# Patient Record
Sex: MALE | Race: WHITE | HISPANIC OR LATINO | Employment: FULL TIME | URBAN - METROPOLITAN AREA
[De-identification: names, ages, dates, MRNs, and addresses within clinical notes are randomized per-mention and may not be internally consistent; named-entity substitution may affect disease eponyms.]

---

## 2022-05-06 ENCOUNTER — HOSPITAL ENCOUNTER (EMERGENCY)
Facility: OTHER | Age: 22
Discharge: HOME | End: 2022-05-06
Attending: EMERGENCY MEDICINE
Payer: COMMERCIAL

## 2022-05-06 VITALS
DIASTOLIC BLOOD PRESSURE: 64 MMHG | TEMPERATURE: 98 F | HEART RATE: 63 BPM | RESPIRATION RATE: 17 BRPM | SYSTOLIC BLOOD PRESSURE: 131 MMHG
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
## 2022-05-06 DIAGNOSIS — R10.9 RIGHT FLANK PAIN: ICD-10-CM

## 2022-05-06 DIAGNOSIS — S20.20XA: ICD-10-CM

## 2022-05-06 LAB
BILIRUB UR QL STRIP: NEGATIVE
CLARITY UR: CLEAR
COLOR UR: YELLOW
GLUCOSE UR QL STRIP: NEGATIVE
HGB UR QL STRIP: ABNORMAL
KETONES UR QL STRIP: NEGATIVE
LEUKOCYTE ESTERASE UR QL STRIP: NEGATIVE
NITRITE UR QL STRIP: NEGATIVE
PH UR STRIP: 6 [PH] (ref 5–8)
PROT UR QL STRIP: NEGATIVE
SP GR UR STRIP: 1.02 (ref 1–1.03)
URN SPEC COLLECT METH UR: ABNORMAL
UROBILINOGEN UR STRIP-ACNC: NEGATIVE EU/DL

## 2022-05-06 PROCEDURE — 25000003 PHARM REV CODE 250: Performed by: EMERGENCY MEDICINE

## 2022-05-06 PROCEDURE — 81003 URINALYSIS AUTO W/O SCOPE: CPT | Performed by: EMERGENCY MEDICINE

## 2022-05-06 PROCEDURE — 99283 EMERGENCY DEPT VISIT LOW MDM: CPT

## 2022-05-06 RX ORDER — KETOROLAC TROMETHAMINE 10 MG/1
10 TABLET, FILM COATED ORAL
Status: COMPLETED | OUTPATIENT
Start: 2022-05-06 | End: 2022-05-06

## 2022-05-06 RX ORDER — NAPROXEN 500 MG/1
500 TABLET ORAL 2 TIMES DAILY WITH MEALS
Qty: 20 TABLET | Refills: 0 | Status: SHIPPED | OUTPATIENT
Start: 2022-05-06 | End: 2023-09-15 | Stop reason: ALTCHOICE

## 2022-05-06 RX ADMIN — KETOROLAC TROMETHAMINE 10 MG: 10 TABLET, FILM COATED ORAL at 08:05
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
## 2023-09-15 ENCOUNTER — OFFICE VISIT (OUTPATIENT)
Dept: URGENT CARE | Facility: CLINIC | Age: 23
End: 2023-09-15

## 2023-09-15 VITALS
TEMPERATURE: 99 F | OXYGEN SATURATION: 98 % | DIASTOLIC BLOOD PRESSURE: 82 MMHG | SYSTOLIC BLOOD PRESSURE: 136 MMHG | WEIGHT: 167.31 LBS | BODY MASS INDEX: 24.78 KG/M2 | RESPIRATION RATE: 19 BRPM | HEART RATE: 82 BPM | HEIGHT: 69 IN

## 2023-09-15 DIAGNOSIS — R51.9 FRONTAL HEADACHE: ICD-10-CM

## 2023-09-15 DIAGNOSIS — R09.81 SINUS CONGESTION: ICD-10-CM

## 2023-09-15 DIAGNOSIS — R51.9 SINUS HEADACHE: Primary | ICD-10-CM

## 2023-09-15 LAB
CTP QC/QA: YES
SARS-COV-2 AG RESP QL IA.RAPID: NEGATIVE

## 2023-09-15 PROCEDURE — 87811 SARS-COV-2 COVID19 W/OPTIC: CPT | Mod: QW,S$GLB,, | Performed by: NURSE PRACTITIONER

## 2023-09-15 PROCEDURE — 99203 OFFICE O/P NEW LOW 30 MIN: CPT | Mod: S$GLB,,, | Performed by: NURSE PRACTITIONER

## 2023-09-15 PROCEDURE — 99203 PR OFFICE/OUTPT VISIT, NEW, LEVL III, 30-44 MIN: ICD-10-PCS | Mod: S$GLB,,, | Performed by: NURSE PRACTITIONER

## 2023-09-15 PROCEDURE — 87811 SARS CORONAVIRUS 2 ANTIGEN POCT, MANUAL READ: ICD-10-PCS | Mod: QW,S$GLB,, | Performed by: NURSE PRACTITIONER

## 2023-09-15 RX ORDER — IBUPROFEN 200 MG
400 TABLET ORAL
Status: COMPLETED | OUTPATIENT
Start: 2023-09-15 | End: 2023-09-15

## 2023-09-15 RX ORDER — IPRATROPIUM BROMIDE 21 UG/1
1 SPRAY, METERED NASAL 2 TIMES DAILY
Qty: 30 ML | Refills: 0 | Status: SHIPPED | OUTPATIENT
Start: 2023-09-15 | End: 2023-09-22

## 2023-09-15 RX ADMIN — Medication 400 MG: at 12:09

## 2023-09-15 NOTE — PATIENT INSTRUCTIONS
Please continue to take either Tylenol or ibuprofen for headache pain.    If you develop new symptoms, including fever, you may return for follow-up.

## 2023-09-15 NOTE — PROGRESS NOTES
"Subjective:      Patient ID: Ishan Livingston is a 23 y.o. male.    Vitals:  height is 5' 9" (1.753 m) and weight is 75.9 kg (167 lb 5.3 oz). His temperature is 99 °F (37.2 °C). His blood pressure is 136/82 and his pulse is 82. His respiration is 19 and oxygen saturation is 98%.     Chief Complaint: Headache    Patient presents a headache and head pressure that started yesterday. Patient hasn't taking any meds.    Provider note begins below:    Patient comes to clinic today with a 1 day history of frontal headache.  He is not taken any medication for this.  He states he is had similar headaches in the past with illness.  Denies fever or chills.  Intermittent mild dry cough but no other respiratory symptoms.  No known sick contacts but patient attends [employer].    Headache   This is a new problem. The current episode started yesterday. The problem occurs constantly. The problem has been unchanged. The pain is at a severity of 8/10. The pain is moderate. Associated symptoms include dizziness. Pertinent negatives include no sinus pressure.       HENT:  Negative for sinus pressure.    Neurological:  Positive for dizziness and headaches.      Objective:     Physical Exam   Constitutional: He is oriented to person, place, and time. He appears well-developed. He is cooperative.  Non-toxic appearance. He does not appear ill. No distress.      Comments:Appears uncomfortable     HENT:   Head: Normocephalic and atraumatic.   Ears:   Right Ear: Hearing, tympanic membrane, external ear and ear canal normal.   Left Ear: Hearing, tympanic membrane, external ear and ear canal normal.   Nose: Rhinorrhea present. No mucosal edema or nasal deformity. No epistaxis. Right sinus exhibits no maxillary sinus tenderness and no frontal sinus tenderness. Left sinus exhibits no maxillary sinus tenderness and no frontal sinus tenderness.   Mouth/Throat: Uvula is midline, oropharynx is clear and moist and mucous membranes are normal. No trismus " in the jaw. Normal dentition. No uvula swelling. Cobblestoning present. No oropharyngeal exudate, posterior oropharyngeal edema or posterior oropharyngeal erythema.   Eyes: Conjunctivae and lids are normal. No scleral icterus.   Neck: Trachea normal and phonation normal. Neck supple. No edema present. No erythema present. No neck rigidity present.   Cardiovascular: Normal rate, regular rhythm, normal heart sounds and normal pulses.   Pulmonary/Chest: Effort normal and breath sounds normal. No respiratory distress. He has no decreased breath sounds. He has no rhonchi.   Abdominal: Normal appearance.   Musculoskeletal: Normal range of motion.         General: No deformity. Normal range of motion.   Neurological: He is alert and oriented to person, place, and time. He exhibits normal muscle tone. Coordination normal.   Skin: Skin is warm, dry, intact, not diaphoretic and not pale.   Psychiatric: His speech is normal and behavior is normal. Judgment and thought content normal.   Nursing note and vitals reviewed.    Results for orders placed or performed in visit on 09/15/23   SARS Coronavirus 2 Antigen, POCT Manual Read   Result Value Ref Range    SARS Coronavirus 2 Antigen Negative Negative     Acceptable Yes        Assessment:     1. Sinus headache    2. Frontal headache    3. Sinus congestion        Plan:     Labs ordered at this visit reviewed.     Sinus headache  -     ipratropium (ATROVENT) 21 mcg (0.03 %) nasal spray; 1 spray by Each Nostril route 2 (two) times daily. for 7 days  Dispense: 30 mL; Refill: 0    Frontal headache  -     ibuprofen tablet 400 mg  -     SARS Coronavirus 2 Antigen, POCT Manual Read  -     ipratropium (ATROVENT) 21 mcg (0.03 %) nasal spray; 1 spray by Each Nostril route 2 (two) times daily. for 7 days  Dispense: 30 mL; Refill: 0    Sinus congestion  -     SARS Coronavirus 2 Antigen, POCT Manual Read  -     ipratropium (ATROVENT) 21 mcg (0.03 %) nasal spray; 1 spray by Each  Nostril route 2 (two) times daily. for 7 days  Dispense: 30 mL; Refill: 0

## 2023-09-15 NOTE — LETTER
September 15, 2023      [facility]  [address] [address]  Phone: [phone]  Fax: [phone]       Patient: Ishan Livingston   YOB: 2000  Date of Visit: 09/15/2023    To Whom It May Concern:    Matteo Livingston  was at [facility] on 09/15/2023. The patient may return to work/school on 9/18/2023 with no restrictions. If you have any questions or concerns, or if I can be of further assistance, please do not hesitate to contact me.    Sincerely,      Pepito Stephenson, NP

## 2024-09-26 ENCOUNTER — OFFICE VISIT (OUTPATIENT)
Dept: URGENT CARE | Facility: CLINIC | Age: 24
End: 2024-09-26

## 2024-09-26 VITALS
RESPIRATION RATE: 18 BRPM | OXYGEN SATURATION: 97 % | TEMPERATURE: 100 F | SYSTOLIC BLOOD PRESSURE: 122 MMHG | HEIGHT: 69 IN | HEART RATE: 84 BPM | BODY MASS INDEX: 25.47 KG/M2 | WEIGHT: 171.94 LBS | DIASTOLIC BLOOD PRESSURE: 73 MMHG

## 2024-09-26 DIAGNOSIS — J02.9 SORE THROAT: ICD-10-CM

## 2024-09-26 DIAGNOSIS — R05.9 COUGH, UNSPECIFIED TYPE: ICD-10-CM

## 2024-09-26 DIAGNOSIS — R09.81 SINUS CONGESTION: ICD-10-CM

## 2024-09-26 DIAGNOSIS — R11.0 NAUSEA: ICD-10-CM

## 2024-09-26 DIAGNOSIS — R51.9 ACUTE NONINTRACTABLE HEADACHE, UNSPECIFIED HEADACHE TYPE: Primary | ICD-10-CM

## 2024-09-26 LAB
CTP QC/QA: YES
POC MOLECULAR INFLUENZA A AGN: NEGATIVE
POC MOLECULAR INFLUENZA B AGN: NEGATIVE

## 2024-09-26 RX ORDER — BENZONATATE 200 MG/1
200 CAPSULE ORAL 3 TIMES DAILY PRN
Qty: 30 CAPSULE | Refills: 0 | Status: SHIPPED | OUTPATIENT
Start: 2024-09-26 | End: 2024-10-06

## 2024-09-26 RX ORDER — ONDANSETRON 4 MG/1
4 TABLET, ORALLY DISINTEGRATING ORAL
Status: COMPLETED | OUTPATIENT
Start: 2024-09-26 | End: 2024-09-26

## 2024-09-26 RX ORDER — ONDANSETRON 4 MG/1
4 TABLET, ORALLY DISINTEGRATING ORAL EVERY 8 HOURS PRN
Qty: 15 TABLET | Refills: 0 | Status: SHIPPED | OUTPATIENT
Start: 2024-09-26 | End: 2024-10-03

## 2024-09-26 RX ADMIN — ONDANSETRON 4 MG: 4 TABLET, ORALLY DISINTEGRATING ORAL at 02:09

## 2024-09-26 NOTE — PROGRESS NOTES
"Subjective:      Patient ID: Ishan Livingston is a 24 y.o. male.    Vitals:  height is 5' 9" (1.753 m) and weight is 78 kg (171 lb 15.3 oz). His oral temperature is 99.6 °F (37.6 °C). His blood pressure is 122/73 and his pulse is 84. His respiration is 18 and oxygen saturation is 97%.     Chief Complaint: Cough    This is a 24 y.o. male who presents today with a chief complaint of cough, headaches and  nausea that started last night. Pt states he has been taking Advil.    23 yo man, c/o a sore throat, sinus congestion, dry cough, nausea, headaches since last night.  The headache is localized to the forehead and behind his eyes.  Patient has not had breakfast because he felt nauseous in the morning.  Took Advil 400 mg last night and this morning on an empty stomach.  He has not had anything to eat today.  Denies neck stiffness, v/d, flank pain, abdominal pain, recent head trauma, LOC, sinus pressure, somnolence, speech problems, weakness in face, arms/legs, loss of sensation/numbness or tingling in face, arms or legs. He denies any history of seasonal allergies, asthma or tobacco use.     Cough  This is a new problem. The current episode started yesterday. The problem has been unchanged. The problem occurs every few minutes. Associated symptoms include headaches. Treatments tried: advil. The treatment provided no relief.       Respiratory:  Positive for cough.    Neurological:  Positive for headaches.      Objective:     Physical Exam   Constitutional: He is oriented to person, place, and time. He appears well-developed. He is cooperative.  Non-toxic appearance. He does not appear ill. No distress.   HENT:   Head: Normocephalic and atraumatic.   Ears:   Right Ear: Hearing, tympanic membrane, external ear and ear canal normal.   Left Ear: Hearing, tympanic membrane, external ear and ear canal normal.   Nose: Nose normal. No mucosal edema, rhinorrhea or nasal deformity. No epistaxis. Right sinus exhibits no maxillary " sinus tenderness and no frontal sinus tenderness. Left sinus exhibits no maxillary sinus tenderness and no frontal sinus tenderness.   Mouth/Throat: Uvula is midline, oropharynx is clear and moist and mucous membranes are normal. Mucous membranes are moist. No trismus in the jaw. Normal dentition. No uvula swelling. No oropharyngeal exudate, posterior oropharyngeal edema or posterior oropharyngeal erythema.      Comments: OP clear; no adenopathy  Eyes: Conjunctivae and lids are normal. No scleral icterus.   Neck: Trachea normal and phonation normal. Neck supple. No edema present. No erythema present. No neck rigidity present.   Cardiovascular: Normal rate, regular rhythm, normal heart sounds and normal pulses.   Pulmonary/Chest: Effort normal and breath sounds normal. No stridor. No respiratory distress. He has no decreased breath sounds. He has no wheezes. He has no rhonchi. He has no rales.   Abdominal: Normal appearance and bowel sounds are normal. He exhibits no distension and no mass. There is no abdominal tenderness. There is no rebound, no guarding, no left CVA tenderness and no right CVA tenderness.   Musculoskeletal: Normal range of motion.         General: No deformity. Normal range of motion.   Neurological: no focal deficit. He is alert and oriented to person, place, and time. He displays no weakness and normal reflexes. No cranial nerve deficit or sensory deficit. He exhibits normal muscle tone. Coordination and gait normal.   Skin: Skin is warm, dry, intact, not diaphoretic and not pale.   Psychiatric: His speech is normal and behavior is normal. Judgment and thought content normal.   Nursing note and vitals reviewed.      Assessment:     1. Acute nonintractable headache, unspecified headache type    2. Sore throat    3. Sinus congestion    4. Nausea    5. Cough, unspecified type      Results for orders placed or performed in visit on 09/26/24   POCT Influenza A/B MOLECULAR   Result Value Ref Range     POC Molecular Influenza A Ag Negative Negative    POC Molecular Influenza B Ag Negative Negative     Acceptable Yes          Plan:   Patient declined a covid test. Strongly advised to do a COVID test at home. Start Zofran for nausea and advance diet as tolerated. Use Tylenol for headaches.  ER prompts given for worsening headaches, neck stiffness or worsening nausea or vomiting.        Acute nonintractable headache, unspecified headache type  -     Cancel: POCT Influenza A/B Rapid Antigen  -     ondansetron disintegrating tablet 4 mg    Sore throat  -     Cancel: POCT Influenza A/B Rapid Antigen  -     POCT Influenza A/B MOLECULAR    Sinus congestion  -     Cancel: POCT Influenza A/B Rapid Antigen    Nausea  -     ondansetron disintegrating tablet 4 mg  -     ondansetron (ZOFRAN-ODT) 4 MG TbDL; Take 1 tablet (4 mg total) by mouth every 8 (eight) hours as needed (as needed for nausea and vomiting).  Dispense: 15 tablet; Refill: 0    Cough, unspecified type  -     benzonatate (TESSALON) 200 MG capsule; Take 1 capsule (200 mg total) by mouth 3 (three) times daily as needed for Cough.  Dispense: 30 capsule; Refill: 0

## 2024-09-26 NOTE — LETTER
September 26, 2024      [facility]  [address] [address]  Phone: [phone]  Fax: [phone]       Patient: Ishan Livingston   YOB: 2000  Date of Visit: 09/26/2024    To Whom It May Concern:    Matteo Livingston  was at [facility] on 09/26/2024. The patient may return to work/school on 09/27/24 with no restrictions. If you have any questions or concerns, or if I can be of further assistance, please do not hesitate to contact me.    Sincerely,    Karishma Hawthorne NP

## 2024-09-26 NOTE — PATIENT INSTRUCTIONS
Start using Zofran as needed for nausea.  Advance your diet as tolerated.  Do not take Advil on an empty stomach.  Go to ER for the worst headache of your life or a worsening headache, nausea, vomiting, neck stiffness or sensitivity to light. Return to clinic as needed. Star using over the counter medications for your symptoms.     Viral URI (upper respiratory infection):    Your symptoms are viral in nature.  Viral upper respiratory infections typically run their course in 7-14 days.     - Rest at home.     - Drink plenty of fluids so you won't get dehydrated.    - Sinus or Chest Congestion recommendations:  - you can take plain Mucinex (guaifenesin) twice a day (or as directed) to help loosen mucus in your sinuses or your chest.     - you can take Sudafed (Pseudoephedrine) for sinus congestion every 4-6 hours as needed.  You have to sign for it at the pharmacy counter with your 's license or a passport.  Do not take Sudafed if you have elevated blood pressure.  Instead, use Coricidin HBP.    - you can use Normal Saline nasal spray as needed for sinus congestion.  It is available over the counter.    - Cough recommendations:      Delsym (Dextromethorphan (DM)) is a cough suppressant over the counter (ie. mucinex DM, robitussin, delsym; dayquil/nyquil has DM as well.).      Warm tea with honey can help with cough. Honey is a natural cough suppressant.    -Sore throat recommendations: Warm fluids, warm salt water gargles, throat lozenges, tea, honey, soup, or drinking something cold or frozen.  Throat lozenges or sprays help reduce pain. Gargling with warm saltwater (1/4 teaspoon of salt in 1/2 cup of warm water) or an OTC anesthetic gargle may be useful for irritation.    - Fever/Pain recommendations:      Alternate Tylenol or Ibuprofen as directed for fever/pain.   Take Advil/Ibuprofen 600-800 mg every 6-8 hours for pain and inflammation. Do not take ibuprofen if you have a history of GI bleeding, kidney  disease, or if you take blood thinners.    Tylenol/acetaminophen 650-1000 mg every 6-8 hours for added pain relief.  Avoid tylenol if you have a history of liver disease.     When to seek medical advice  Call your healthcare provider right away if any of these occur:  Fever that is poorly controlled with OTC fever reducing medication  New or worsening ear pain, sinus pain, or headache  Stiff neck  You can't swallow liquids or you can't open your mouth wide because of throat pain  Signs of dehydration. These include very dark urine or no urine, sunken eyes, and dizziness.  Trouble breathing or noisy breathing  Muffled voice  Rash     If your symptoms worsen or fail to improve you should go to Emergency Department.